# Patient Record
Sex: FEMALE | Race: WHITE | NOT HISPANIC OR LATINO | URBAN - METROPOLITAN AREA
[De-identification: names, ages, dates, MRNs, and addresses within clinical notes are randomized per-mention and may not be internally consistent; named-entity substitution may affect disease eponyms.]

---

## 2023-06-29 ENCOUNTER — OFFICE VISIT (OUTPATIENT)
Dept: URGENT CARE | Facility: MEDICAL CENTER | Age: 63
End: 2023-06-29
Payer: COMMERCIAL

## 2023-06-29 ENCOUNTER — APPOINTMENT (OUTPATIENT)
Dept: URGENT CARE | Facility: MEDICAL CENTER | Age: 63
End: 2023-06-29
Payer: COMMERCIAL

## 2023-06-29 VITALS
RESPIRATION RATE: 18 BRPM | DIASTOLIC BLOOD PRESSURE: 86 MMHG | HEART RATE: 73 BPM | HEIGHT: 67 IN | SYSTOLIC BLOOD PRESSURE: 155 MMHG | TEMPERATURE: 97.7 F | OXYGEN SATURATION: 99 % | WEIGHT: 153 LBS | BODY MASS INDEX: 24.01 KG/M2

## 2023-06-29 DIAGNOSIS — L03.116 CELLULITIS OF LEFT LOWER EXTREMITY: Primary | ICD-10-CM

## 2023-06-29 PROCEDURE — 99213 OFFICE O/P EST LOW 20 MIN: CPT | Performed by: PHYSICIAN ASSISTANT

## 2023-06-29 RX ORDER — LYSINE HCL 500 MG
TABLET ORAL
COMMUNITY

## 2023-06-29 RX ORDER — CEPHALEXIN 500 MG/1
500 CAPSULE ORAL EVERY 6 HOURS SCHEDULED
Qty: 28 CAPSULE | Refills: 0 | Status: SHIPPED | OUTPATIENT
Start: 2023-06-29 | End: 2023-07-06

## 2023-06-30 NOTE — PROGRESS NOTES
[facility]        NAME: Lily Mejia is a 70 y o  female  : 1960    MRN: [id number]  DATE: 2023  TIME: 8:51 PM    Assessment and Plan   Cellulitis of left lower extremity [L03 116]  1  Cellulitis of left lower extremity  cephalexin (KEFLEX) 500 mg capsule            Patient Instructions     1  Motrin as needed for pain  2  Take Keflex 500mg  4x daily x 7 days  3  Follow up with PCP in 3-5 days  4  Proceed to  ER if symptoms worsen  Chief Complaint     Chief Complaint   Patient presents with   • Leg Pain     Pt reports left leg pain and an itchy rash with redness on her left leg  History of Present Illness       Kevin Baker is a 39-year-old female presents with a 2-day history of left lower leg pain, redness and swelling  Patient reports she fell on a stream approximately 2 weeks prior  She had x-rays done initially and was given a tetanus shot but had 2 small abrasions on her left leg  Patient reports she has had continuous soreness of her left lower leg and knee  She started with a rash on her left lower leg earlier today  Patient reports no fever, chills or body aches since the onset of her symptoms  Leg Pain         Review of Systems   Review of Systems   Constitutional: Negative  HENT: Negative  Musculoskeletal: Positive for gait problem  Skin: Positive for color change and wound           Current Medications       Current Outpatient Medications:   •  Calcium 250 MG CAPS, Take by mouth, Disp: , Rfl:   •  cephalexin (KEFLEX) 500 mg capsule, Take 1 capsule (500 mg total) by mouth every 6 (six) hours for 7 days, Disp: 28 capsule, Rfl: 0  •  cholecalciferol (VITAMIN D3) 1,000 units tablet, Take by mouth, Disp: , Rfl:   •  cyanocobalamin (VITAMIN B-12) 100 MCG tablet, Take 100 mcg by mouth daily, Disp: , Rfl:   •  metFORMIN (GLUCOPHAGE) 500 mg tablet, Take 500 mg by mouth 2 (two) times a day with meals, Disp: , Rfl:   •  Multiple Vitamins-Minerals (One Daily For Women 50+ "Adv) TABS, Take by mouth, Disp: , Rfl:     Current Allergies     Allergies as of 06/29/2023 - Reviewed 06/29/2023   Allergen Reaction Noted   • Sulfa antibiotics Other (See Comments) 04/30/2013            The following portions of the patient's history were reviewed and updated as appropriate: allergies, current medications, past family history, past medical history, past social history, past surgical history and problem list      History reviewed  No pertinent past medical history  Past Surgical History:   Procedure Laterality Date   • APPENDECTOMY         Family History   Problem Relation Age of Onset   • Cerebral aneurysm Mother    • Hypertension Father    • Hypothyroidism Sister          Medications have been verified  Objective   /86   Pulse 73   Temp 97 7 °F (36 5 °C) (Temporal)   Resp 18   Ht 5' 7\" (1 702 m)   Wt 69 4 kg (153 lb)   SpO2 99%   BMI 23 96 kg/m²   No LMP recorded  Patient is postmenopausal        Physical Exam     Physical Exam  Constitutional:       General: She is not in acute distress  Appearance: Normal appearance  She is not ill-appearing  Cardiovascular:      Rate and Rhythm: Normal rate and regular rhythm  Heart sounds: Normal heart sounds, S1 normal and S2 normal  No murmur heard  Pulmonary:      Effort: Pulmonary effort is normal       Breath sounds: Normal breath sounds and air entry  Musculoskeletal:      Left knee: No effusion  No tenderness  No medial joint line, lateral joint line or patellar tendon tenderness  No LCL laxity, MCL laxity, ACL laxity or PCL laxity  Skin:     Comments: Diffuse, erythematous patches noted on the lateral aspect of the left lower leg and the lower aspect over the medial malleolus  Skin is slightly warm to touch and tender to palpation  There are 2 small abrasions noted on the lateral aspect of the left lower leg that appear to be healing  Neurological:      Mental Status: She is alert                     "

## 2023-06-30 NOTE — PATIENT INSTRUCTIONS
1  Motrin as needed for pain  2  Take Keflex 500mg  4x daily x 7 days  3  Follow up with PCP in 3-5 days  4  Proceed to  ER if symptoms worsen